# Patient Record
Sex: MALE | Race: AMERICAN INDIAN OR ALASKA NATIVE | NOT HISPANIC OR LATINO | Employment: FULL TIME | URBAN - NONMETROPOLITAN AREA
[De-identification: names, ages, dates, MRNs, and addresses within clinical notes are randomized per-mention and may not be internally consistent; named-entity substitution may affect disease eponyms.]

---

## 2024-09-10 ENCOUNTER — HOSPITAL ENCOUNTER (EMERGENCY)
Facility: HOSPITAL | Age: 29
Discharge: HOME | End: 2024-09-10
Attending: EMERGENCY MEDICINE
Payer: COMMERCIAL

## 2024-09-10 VITALS
BODY MASS INDEX: 29.78 KG/M2 | HEIGHT: 70 IN | OXYGEN SATURATION: 100 % | WEIGHT: 208 LBS | HEART RATE: 71 BPM | DIASTOLIC BLOOD PRESSURE: 79 MMHG | SYSTOLIC BLOOD PRESSURE: 121 MMHG | RESPIRATION RATE: 18 BRPM | TEMPERATURE: 98 F

## 2024-09-10 DIAGNOSIS — R07.89 CHEST WALL PAIN: ICD-10-CM

## 2024-09-10 DIAGNOSIS — R07.81 RIB PAIN ON LEFT SIDE: ICD-10-CM

## 2024-09-10 DIAGNOSIS — S22.42XA CLOSED FRACTURE OF MULTIPLE RIBS OF LEFT SIDE, INITIAL ENCOUNTER: Primary | ICD-10-CM

## 2024-09-10 PROCEDURE — 99284 EMERGENCY DEPT VISIT MOD MDM: CPT | Mod: 25

## 2024-09-10 PROCEDURE — 96372 THER/PROPH/DIAG INJ SC/IM: CPT | Performed by: EMERGENCY MEDICINE

## 2024-09-10 PROCEDURE — 63600175 PHARM REV CODE 636 W HCPCS: Performed by: EMERGENCY MEDICINE

## 2024-09-10 RX ORDER — MELOXICAM 15 MG/1
15 TABLET ORAL DAILY
COMMUNITY

## 2024-09-10 RX ORDER — HYDROCODONE BITARTRATE AND ACETAMINOPHEN 5; 325 MG/1; MG/1
1 TABLET ORAL EVERY 6 HOURS PRN
Qty: 12 TABLET | Refills: 0 | Status: SHIPPED | OUTPATIENT
Start: 2024-09-10

## 2024-09-10 RX ORDER — DICLOFENAC SODIUM 75 MG/1
75 TABLET, DELAYED RELEASE ORAL 2 TIMES DAILY PRN
Qty: 10 TABLET | Refills: 0 | Status: SHIPPED | OUTPATIENT
Start: 2024-09-10

## 2024-09-10 RX ORDER — KETOROLAC TROMETHAMINE 30 MG/ML
30 INJECTION, SOLUTION INTRAMUSCULAR; INTRAVENOUS
Status: COMPLETED | OUTPATIENT
Start: 2024-09-10 | End: 2024-09-10

## 2024-09-10 RX ORDER — CYCLOBENZAPRINE HCL 10 MG
10 TABLET ORAL 3 TIMES DAILY PRN
COMMUNITY

## 2024-09-10 RX ADMIN — KETOROLAC TROMETHAMINE 30 MG: 30 INJECTION, SOLUTION INTRAMUSCULAR at 10:09

## 2024-09-10 NOTE — Clinical Note
"Altaf Haney" Vinay was seen and treated in our emergency department on 9/10/2024.  He may return to work on 09/16/2024.       If you have any questions or concerns, please don't hesitate to call.      Sanjuanita AUGUSTIN    "

## 2024-09-10 NOTE — ED PROVIDER NOTES
"Encounter Date: 9/10/2024       History     Chief Complaint   Patient presents with    Rib Injury     LEFT RIB PAIN CONTINUED X 1 WEEK. REPORTS FELL DOWN 6 STEPS 1 WEEK AGO, HAD XRAY AT [facility] AT THAT TIME HOWEVER "PAIN WORSE"     29 yo male here with L rib pain after a fall one week ago. Seen initially in [place] and told was bruised. Started on NSAID with minimal relief. No dyspnea. Worse with movement, deep respirations.       Review of patient's allergies indicates:  No Known Allergies  History reviewed. No pertinent past medical history.  Past Surgical History:   Procedure Laterality Date    ADENOIDECTOMY       No family history on file.  Social History     Tobacco Use    Smoking status: Every Day     Types: Vaping with nicotine    Smokeless tobacco: Never   Substance Use Topics    Alcohol use: Yes    Drug use: Never     Review of Systems   Constitutional: Negative.    Respiratory: Negative.     Cardiovascular:  Positive for chest pain.   Gastrointestinal: Negative.    All other systems reviewed and are negative.      Physical Exam     Initial Vitals [09/10/24 1026]   BP Pulse Resp Temp SpO2   121/79 71 18 98.4 °F (36.9 °C) 100 %      MAP       --         Physical Exam    Nursing note and vitals reviewed.  Constitutional: He appears well-developed and well-nourished. He is not diaphoretic. No distress.   HENT:   Head: Normocephalic and atraumatic.   Eyes: EOM are normal. Pupils are equal, round, and reactive to light.   Neck: Neck supple.   Normal range of motion.  Cardiovascular:  Normal rate, regular rhythm and intact distal pulses.           Pulmonary/Chest: Breath sounds normal. No respiratory distress. He has no wheezes. He has no rales. He exhibits tenderness (L ribs).   Abdominal: Abdomen is soft. Bowel sounds are normal. He exhibits no distension. There is no abdominal tenderness. There is no rebound.   Musculoskeletal:         General: No tenderness or edema. Normal range of motion.      Cervical " back: Normal range of motion and neck supple.     Neurological: He is alert and oriented to person, place, and time. GCS score is 15. GCS eye subscore is 4. GCS verbal subscore is 5. GCS motor subscore is 6.   Skin: Skin is warm and dry. Capillary refill takes less than 2 seconds.   Psychiatric: He has a normal mood and affect. Thought content normal.         ED Course   Procedures  Labs Reviewed - No data to display       Imaging Results              X-Ray Chest 1 View (Final result)  Result time 09/10/24 10:52:45      Final result by Erlin Glover MD (09/10/24 10:52:45)                   Impression:      As above      Electronically signed by: Erlin Glover MD  Date:    09/10/2024  Time:    10:52               Narrative:    EXAMINATION:  XR CHEST 1 VIEW    CLINICAL HISTORY:  Other chest pain    TECHNIQUE:  Frontal view obtained of the chest    COMPARISON:  No priors available    FINDINGS:  No cardiac silhouette enlargement.  Unremarkable pulmonary vasculature.  Suspected anterior left rib fracture seen on concomitant performed rib series not clearly delineated on this image.  No consolidation or evidence of pneumothorax.                                       X-Ray Ribs 2 View Left (Final result)  Result time 09/10/24 10:51:27      Final result by Erlin Glover MD (09/10/24 10:51:27)                   Impression:      Subtle findings of the left 6 through 8th ribs anteriorly suspect for nondisplaced fractures.      Electronically signed by: Erlin Glover MD  Date:    09/10/2024  Time:    10:51               Narrative:    EXAMINATION:  XR RIBS 2 VIEW LEFT    CLINICAL HISTORY:  Pleurodynia    TECHNIQUE:  Four views left rib detail    COMPARISON:  09/10/2024    FINDINGS:  Lucency and irregularity involving the anterior aspect of the left 8th rib consistent with a nondisplaced subtle fracture.  Questionable subtle fracture the adjacent anterior left 7th and possibly left 6th rib as well.                                     X-Rays:   Independently Interpreted Readings:   Other Readings:  L ribs: ? Multiple subtle anterior ribs fxs    Medications   ketorolac injection 30 mg (30 mg Intramuscular Given 9/10/24 1042)     Medical Decision Making  L rib fractures noted in imaging. Will treat as such. Will send home with IS.     Problems Addressed:  Closed fracture of multiple ribs of left side, initial encounter: acute illness or injury    Amount and/or Complexity of Data Reviewed  Radiology: ordered and independent interpretation performed. Decision-making details documented in ED Course.    Risk  Prescription drug management.                                      Clinical Impression:  Final diagnoses:  [R07.81] Rib pain on left side  [R07.89] Chest wall pain  [S22.42XA] Closed fracture of multiple ribs of left side, initial encounter (Primary)          ED Disposition Condition    Discharge Stable          ED Prescriptions       Medication Sig Dispense Start Date End Date Auth. Provider    HYDROcodone-acetaminophen (NORCO) 5-325 mg per tablet Take 1 tablet by mouth every 6 (six) hours as needed for Pain. 12 tablet 9/10/2024 -- Orville Dewitt MD    diclofenac (VOLTAREN) 75 MG EC tablet Take 1 tablet (75 mg total) by mouth 2 (two) times daily as needed. 10 tablet 9/10/2024 -- Orville Dewitt MD          Follow-up Information       Follow up With Specialties Details Why Contact Info    Rigoberto Wolf, NP Family Medicine Schedule an appointment as soon as possible for a visit   [address] [facility]  [place] [address]  [phone]               Orville Dewitt MD  09/15/24 0614